# Patient Record
Sex: FEMALE | Race: WHITE | NOT HISPANIC OR LATINO | Employment: FULL TIME | URBAN - METROPOLITAN AREA
[De-identification: names, ages, dates, MRNs, and addresses within clinical notes are randomized per-mention and may not be internally consistent; named-entity substitution may affect disease eponyms.]

---

## 2021-04-16 ENCOUNTER — BULK ORDERING (OUTPATIENT)
Dept: CASE MANAGEMENT | Facility: OTHER | Age: 41
End: 2021-04-16

## 2021-04-16 DIAGNOSIS — Z23 IMMUNIZATION DUE: ICD-10-CM

## 2025-05-19 ENCOUNTER — APPOINTMENT (OUTPATIENT)
Dept: GENERAL RADIOLOGY | Facility: HOSPITAL | Age: 45
End: 2025-05-19
Payer: COMMERCIAL

## 2025-05-19 ENCOUNTER — HOSPITAL ENCOUNTER (EMERGENCY)
Facility: HOSPITAL | Age: 45
Discharge: HOME | End: 2025-05-19
Attending: STUDENT IN AN ORGANIZED HEALTH CARE EDUCATION/TRAINING PROGRAM | Admitting: STUDENT IN AN ORGANIZED HEALTH CARE EDUCATION/TRAINING PROGRAM
Payer: COMMERCIAL

## 2025-05-19 VITALS
RESPIRATION RATE: 18 BRPM | DIASTOLIC BLOOD PRESSURE: 92 MMHG | HEART RATE: 89 BPM | WEIGHT: 130 LBS | TEMPERATURE: 98.1 F | HEIGHT: 62 IN | OXYGEN SATURATION: 99 % | SYSTOLIC BLOOD PRESSURE: 149 MMHG | BODY MASS INDEX: 23.92 KG/M2

## 2025-05-19 DIAGNOSIS — S46.912A LEFT SHOULDER STRAIN, INITIAL ENCOUNTER: Primary | ICD-10-CM

## 2025-05-19 LAB
ANION GAP SERPL CALCULATED.3IONS-SCNC: 12.5 MMOL/L (ref 5–15)
BASOPHILS # BLD AUTO: 0.04 10*3/MM3 (ref 0–0.2)
BASOPHILS NFR BLD AUTO: 0.5 % (ref 0–1.5)
BUN SERPL-MCNC: 9 MG/DL (ref 6–20)
BUN/CREAT SERPL: 15.5 (ref 7–25)
CALCIUM SPEC-SCNC: 8.9 MG/DL (ref 8.6–10.5)
CHLORIDE SERPL-SCNC: 104 MMOL/L (ref 98–107)
CO2 SERPL-SCNC: 22.5 MMOL/L (ref 22–29)
CREAT SERPL-MCNC: 0.58 MG/DL (ref 0.57–1)
D DIMER PPP FEU-MCNC: <0.27 MCGFEU/ML (ref 0–0.5)
DEPRECATED RDW RBC AUTO: 40.5 FL (ref 37–54)
EGFRCR SERPLBLD CKD-EPI 2021: 115.3 ML/MIN/1.73
EOSINOPHIL # BLD AUTO: 0.16 10*3/MM3 (ref 0–0.4)
EOSINOPHIL NFR BLD AUTO: 2.2 % (ref 0.3–6.2)
ERYTHROCYTE [DISTWIDTH] IN BLOOD BY AUTOMATED COUNT: 11.8 % (ref 12.3–15.4)
GLUCOSE SERPL-MCNC: 124 MG/DL (ref 65–99)
HCG SERPL QL: NEGATIVE
HCT VFR BLD AUTO: 40.4 % (ref 34–46.6)
HGB BLD-MCNC: 13.5 G/DL (ref 12–15.9)
IMM GRANULOCYTES # BLD AUTO: 0.02 10*3/MM3 (ref 0–0.05)
IMM GRANULOCYTES NFR BLD AUTO: 0.3 % (ref 0–0.5)
LYMPHOCYTES # BLD AUTO: 2.89 10*3/MM3 (ref 0.7–3.1)
LYMPHOCYTES NFR BLD AUTO: 39.4 % (ref 19.6–45.3)
MCH RBC QN AUTO: 30.6 PG (ref 26.6–33)
MCHC RBC AUTO-ENTMCNC: 33.4 G/DL (ref 31.5–35.7)
MCV RBC AUTO: 91.6 FL (ref 79–97)
MONOCYTES # BLD AUTO: 0.49 10*3/MM3 (ref 0.1–0.9)
MONOCYTES NFR BLD AUTO: 6.7 % (ref 5–12)
NEUTROPHILS NFR BLD AUTO: 3.74 10*3/MM3 (ref 1.7–7)
NEUTROPHILS NFR BLD AUTO: 50.9 % (ref 42.7–76)
PLATELET # BLD AUTO: 319 10*3/MM3 (ref 140–450)
PMV BLD AUTO: 9.6 FL (ref 6–12)
POTASSIUM SERPL-SCNC: 3.8 MMOL/L (ref 3.5–5.2)
RBC # BLD AUTO: 4.41 10*6/MM3 (ref 3.77–5.28)
SODIUM SERPL-SCNC: 139 MMOL/L (ref 136–145)
TROPONIN T SERPL HS-MCNC: <6 NG/L
WBC NRBC COR # BLD AUTO: 7.34 10*3/MM3 (ref 3.4–10.8)

## 2025-05-19 PROCEDURE — 84703 CHORIONIC GONADOTROPIN ASSAY: CPT | Performed by: STUDENT IN AN ORGANIZED HEALTH CARE EDUCATION/TRAINING PROGRAM

## 2025-05-19 PROCEDURE — 71045 X-RAY EXAM CHEST 1 VIEW: CPT

## 2025-05-19 PROCEDURE — 93005 ELECTROCARDIOGRAM TRACING: CPT | Performed by: STUDENT IN AN ORGANIZED HEALTH CARE EDUCATION/TRAINING PROGRAM

## 2025-05-19 PROCEDURE — 84484 ASSAY OF TROPONIN QUANT: CPT | Performed by: STUDENT IN AN ORGANIZED HEALTH CARE EDUCATION/TRAINING PROGRAM

## 2025-05-19 PROCEDURE — 73030 X-RAY EXAM OF SHOULDER: CPT

## 2025-05-19 PROCEDURE — 85379 FIBRIN DEGRADATION QUANT: CPT | Performed by: STUDENT IN AN ORGANIZED HEALTH CARE EDUCATION/TRAINING PROGRAM

## 2025-05-19 PROCEDURE — 93010 ELECTROCARDIOGRAM REPORT: CPT | Performed by: STUDENT IN AN ORGANIZED HEALTH CARE EDUCATION/TRAINING PROGRAM

## 2025-05-19 PROCEDURE — 99284 EMERGENCY DEPT VISIT MOD MDM: CPT | Performed by: STUDENT IN AN ORGANIZED HEALTH CARE EDUCATION/TRAINING PROGRAM

## 2025-05-19 PROCEDURE — 93005 ELECTROCARDIOGRAM TRACING: CPT

## 2025-05-19 PROCEDURE — 85025 COMPLETE CBC W/AUTO DIFF WBC: CPT | Performed by: STUDENT IN AN ORGANIZED HEALTH CARE EDUCATION/TRAINING PROGRAM

## 2025-05-19 PROCEDURE — 80048 BASIC METABOLIC PNL TOTAL CA: CPT | Performed by: STUDENT IN AN ORGANIZED HEALTH CARE EDUCATION/TRAINING PROGRAM

## 2025-05-19 RX ORDER — HYDROCODONE BITARTRATE AND ACETAMINOPHEN 5; 325 MG/1; MG/1
1 TABLET ORAL ONCE
Refills: 0 | Status: COMPLETED | OUTPATIENT
Start: 2025-05-19 | End: 2025-05-19

## 2025-05-19 RX ORDER — NAPROXEN 500 MG/1
500 TABLET ORAL 2 TIMES DAILY PRN
Qty: 20 TABLET | Refills: 0 | Status: SHIPPED | OUTPATIENT
Start: 2025-05-19

## 2025-05-19 RX ORDER — CYCLOBENZAPRINE HCL 5 MG
5 TABLET ORAL 3 TIMES DAILY PRN
Qty: 15 TABLET | Refills: 0 | Status: SHIPPED | OUTPATIENT
Start: 2025-05-19

## 2025-05-19 RX ADMIN — HYDROCODONE BITARTRATE AND ACETAMINOPHEN 1 TABLET: 5; 325 TABLET ORAL at 18:56

## 2025-05-19 NOTE — Clinical Note
[facility] EMERGENCY DEPARTMENT  [address]  [place] [address]  Phone: [phone]    Judy Gannon was seen and treated in our emergency department on 5/19/2025.  She may return to work on 05/21/2025.         Thank you for choosing [facility].    Mati Jimenez, DO

## 2025-05-19 NOTE — ED PROVIDER NOTES
Subjective   History of Present Illness  45-year-old female presents to the emergency department complaints of left shoulder pain is radiating to the chest that started 2 days ago.  She denies medical history.  Denies any recent fall, trauma, injury, numbness tingling or loss of sensation to the upper extremity.  No dyspnea on exertion, cough, fever.  No history of DVT or PE.      Review of Systems   Constitutional:  Negative for activity change, appetite change, diaphoresis and fatigue.   All other systems reviewed and are negative.      Past Medical History:   Diagnosis Date    Disease of thyroid gland        No Known Allergies    Past Surgical History:   Procedure Laterality Date    DENTAL PROCEDURE      THYROID SURGERY         History reviewed. No pertinent family history.    Social History     Socioeconomic History    Marital status: Single   Tobacco Use    Smoking status: Never    Smokeless tobacco: Never   Vaping Use    Vaping status: Never Used   Substance and Sexual Activity    Alcohol use: No    Drug use: Defer    Sexual activity: Defer           Objective   Physical Exam  Vitals and nursing note reviewed.   Constitutional:       General: She is not in acute distress.     Appearance: Normal appearance. She is not ill-appearing, toxic-appearing or diaphoretic.   HENT:      Head: Normocephalic and atraumatic.      Right Ear: Tympanic membrane and external ear normal.      Left Ear: Tympanic membrane and external ear normal.      Nose: Nose normal. No congestion or rhinorrhea.      Mouth/Throat:      Mouth: Mucous membranes are moist.   Eyes:      Conjunctiva/sclera: Conjunctivae normal.      Pupils: Pupils are equal, round, and reactive to light.   Cardiovascular:      Rate and Rhythm: Normal rate and regular rhythm.      Pulses: Normal pulses.   Pulmonary:      Effort: Pulmonary effort is normal. No respiratory distress.      Breath sounds: Normal breath sounds. No stridor. No wheezing, rhonchi or rales.    Abdominal:      General: There is no distension.   Musculoskeletal:         General: No swelling or deformity. Normal range of motion.      Cervical back: Normal range of motion and neck supple. No rigidity.      Comments: On evaluation of the shoulder, there is no crepitus or obvious deformity, no dislocation is noted.  Pulses 2+ in the bilateral upper extremities and  strength is within normal limits.  No pain to the elbow suggestive of the lateral or medial epicondylitis.  There is no tenderness of the cervical spine.    Neer test for evaluation o the rotator cuff is negative for significant impingement or abnormality     Hua test shows no evidence of impingement or pain.   Skin:     General: Skin is warm.      Coloration: Skin is not pale.   Neurological:      General: No focal deficit present.      Mental Status: She is alert and oriented to person, place, and time. Mental status is at baseline.   Psychiatric:         Mood and Affect: Mood normal.         Thought Content: Thought content normal.         Procedures           ED Course                                                       Medical Decision Making    MDM:    Escalation of care including admission/observation considered    - Discussions of management with other providers:  None    - Discussed/reviewed with Radiology regarding test interpretation    - Independent interpretation: Labs and XR    - Additional patient history obtained from: None    - Review of external non-ED record (if available):  Prior Inpt record, Office record, Outpt record, Prior Outpt labs, PCP record, Outside ED record, Other    - Chronic conditions affecting care: See HPI and medical Hx.    - Social Determinants of health significantly affecting care:  None        Medical Decision Making Discussion:    45-year-old presents to the emergency department with complaints of some left shoulder pain.  The patient states that she normally carries her computer on with her left  arm and then types with her right hand while she was holding the computer.  I believe that this is a musculoskeletal strain type injury.  Her workup is negative.  Her troponin is negative.  D-dimer negative.  Repeat troponin not indicated as this has been ongoing now for 2 days.  She has reproducible tenderness over the left superior aspect of the left shoulder and I will give muscle relaxers and anti-inflammatories but she will follow-up with PCP outpatient for MRI as needed for shoulder evaluation.  Otherwise, recommend range of motion exercises and heat therapy she is agreeable.  Stable for discharge.    The patient has been given very strict return precautions to return to the emergency department should there be any acute change or worsening of their condition.  I have explained my findings and the patient has expressed understanding to me.  I explained that the work-up performed in the ED has been based on the specific complaint and concern, as the nature of emergency medicine is complaint driven and they understand that new symptoms may arise.  I have told them that, should there be any new symptoms, worsening or changing symptoms, a new work-up may be indicated that they are encouraged to return to the emergency department or promptly contact their primary care physician. We have employed a shared decision-making process as the discussion of their disposition.  The patient has been educated as to the nature of the visit, the tests and work-up performed and the findings from today's visit. At this time, there does not appear to be any acute emergent process that necessitates admission to the hospital, however, the patient understands that this can change unexpectedly. At this time, the patient is stable for discharge home and agrees to follow-up with her primary care physician in the next 24 to 48 hours or earlier should they be able to obtain an appointment.    The patient was counseled regarding diagnostic  results and treatment plan and patient has indicated understanding of these instructions.      Problems Addressed:  Left shoulder strain, initial encounter: complicated acute illness or injury    Amount and/or Complexity of Data Reviewed  Labs: ordered.  Radiology: ordered.  ECG/medicine tests: ordered.    Risk  Prescription drug management.        Final diagnoses:   Left shoulder strain, initial encounter       ED Disposition  ED Disposition       ED Disposition   Discharge    Condition   Good    Comment   --               Hector Irwin Jr., APRN  [address] [facility] [id number] [phone]               Medication List        New Prescriptions      cyclobenzaprine 5 MG tablet  Commonly known as: FLEXERIL  Take 1 tablet by mouth 3 (Three) Times a Day As Needed for Muscle Spasms.     naproxen 500 MG EC tablet  Commonly known as: EC NAPROSYN  Take 1 tablet by mouth 2 (Two) Times a Day As Needed (pain).               Where to Get Your Medications        These medications were sent to [facility] [id number] - [place], [place] - [address] [facility] [address] - [phone] [facility] [phone] FX [address] [address]      Phone: [phone]   cyclobenzaprine 5 MG tablet  naproxen 500 MG EC tablet            Mati Jimenez,   05/19/25 [id number]

## 2025-05-20 LAB
QT INTERVAL: 342 MS
QTC INTERVAL: 421 MS